# Patient Record
Sex: MALE | Race: WHITE | NOT HISPANIC OR LATINO | Employment: FULL TIME | URBAN - METROPOLITAN AREA
[De-identification: names, ages, dates, MRNs, and addresses within clinical notes are randomized per-mention and may not be internally consistent; named-entity substitution may affect disease eponyms.]

---

## 2023-03-24 ENCOUNTER — OFFICE VISIT (OUTPATIENT)
Dept: PRIMARY CARE | Facility: CLINIC | Age: 68
End: 2023-03-24
Payer: MEDICARE

## 2023-03-24 VITALS
HEART RATE: 76 BPM | WEIGHT: 203 LBS | OXYGEN SATURATION: 98 % | SYSTOLIC BLOOD PRESSURE: 126 MMHG | BODY MASS INDEX: 28.31 KG/M2 | DIASTOLIC BLOOD PRESSURE: 80 MMHG

## 2023-03-24 DIAGNOSIS — H69.92 ACUTE DYSFUNCTION OF LEFT EUSTACHIAN TUBE: Primary | ICD-10-CM

## 2023-03-24 PROBLEM — Z20.822 EXPOSURE TO COVID-19 VIRUS: Status: ACTIVE | Noted: 2023-03-24

## 2023-03-24 PROBLEM — R35.1 NOCTURIA: Status: ACTIVE | Noted: 2023-03-24

## 2023-03-24 PROBLEM — E03.8 SUBCLINICAL HYPOTHYROIDISM: Status: ACTIVE | Noted: 2023-03-24

## 2023-03-24 PROBLEM — L82.1 SEBORRHEIC KERATOSES: Status: ACTIVE | Noted: 2023-03-24

## 2023-03-24 PROBLEM — M25.569 ARTHRALGIA OF KNEE: Status: ACTIVE | Noted: 2017-09-18

## 2023-03-24 PROBLEM — M77.12 LATERAL EPICONDYLITIS OF LEFT ELBOW: Status: ACTIVE | Noted: 2023-03-24

## 2023-03-24 PROBLEM — E10.65 TYPE 1 DIABETES MELLITUS WITH HYPERGLYCEMIA (MULTI): Status: ACTIVE | Noted: 2020-10-26

## 2023-03-24 PROBLEM — E13.9 LADA (LATENT AUTOIMMUNE DIABETES IN ADULTS), MANAGED AS TYPE 1 (MULTI): Status: ACTIVE | Noted: 2020-10-26

## 2023-03-24 PROBLEM — E11.9 DIABETES (MULTI): Status: ACTIVE | Noted: 2017-09-18

## 2023-03-24 PROBLEM — E78.2 HYPERLIPEMIA, MIXED: Status: ACTIVE | Noted: 2017-09-18

## 2023-03-24 PROBLEM — B02.9 SHINGLES: Status: ACTIVE | Noted: 2023-03-24

## 2023-03-24 PROBLEM — R09.82 POST-NASAL DRIP: Status: ACTIVE | Noted: 2017-09-18

## 2023-03-24 PROBLEM — E06.3 HASHIMOTO'S THYROIDITIS: Status: ACTIVE | Noted: 2017-09-18

## 2023-03-24 PROBLEM — E78.5 HYPERLIPIDEMIA: Status: ACTIVE | Noted: 2023-03-24

## 2023-03-24 PROBLEM — Z20.822 SUSPECTED COVID-19 VIRUS INFECTION: Status: ACTIVE | Noted: 2023-03-24

## 2023-03-24 PROBLEM — E03.9 ACQUIRED HYPOTHYROIDISM: Status: ACTIVE | Noted: 2020-10-26

## 2023-03-24 PROBLEM — M25.50 ARTHRALGIA: Status: ACTIVE | Noted: 2023-03-24

## 2023-03-24 PROBLEM — Z77.018 EXPOSURE TO MERCURY: Status: ACTIVE | Noted: 2017-09-18

## 2023-03-24 PROCEDURE — 1036F TOBACCO NON-USER: CPT | Performed by: NURSE PRACTITIONER

## 2023-03-24 PROCEDURE — 1159F MED LIST DOCD IN RCRD: CPT | Performed by: NURSE PRACTITIONER

## 2023-03-24 PROCEDURE — 3074F SYST BP LT 130 MM HG: CPT | Performed by: NURSE PRACTITIONER

## 2023-03-24 PROCEDURE — 99213 OFFICE O/P EST LOW 20 MIN: CPT | Performed by: NURSE PRACTITIONER

## 2023-03-24 PROCEDURE — 3079F DIAST BP 80-89 MM HG: CPT | Performed by: NURSE PRACTITIONER

## 2023-03-24 RX ORDER — LEVOTHYROXINE SODIUM 175 UG/1
175 TABLET ORAL DAILY
COMMUNITY

## 2023-03-24 RX ORDER — TRIAMCINOLONE ACETONIDE 55 UG/1
2 SPRAY, METERED NASAL DAILY
Qty: 16.5 G | Refills: 0 | Status: SHIPPED | OUTPATIENT
Start: 2023-03-24 | End: 2024-03-23

## 2023-03-24 RX ORDER — INSULIN GLARGINE 100 [IU]/ML
INJECTION, SOLUTION SUBCUTANEOUS
COMMUNITY
Start: 2023-01-30

## 2023-03-24 RX ORDER — INSULIN LISPRO 100 [IU]/ML
INJECTION, SOLUTION INTRAVENOUS; SUBCUTANEOUS
COMMUNITY
Start: 2023-01-30

## 2023-03-24 RX ORDER — ROSUVASTATIN CALCIUM 5 MG/1
5 TABLET, COATED ORAL NIGHTLY
COMMUNITY

## 2023-03-24 NOTE — PROGRESS NOTES
Subjective   Patient ID: Sundeep Perez is a 67 y.o. male who presents for ear pain left side      Review of Systems   HENT:  Positive for hearing loss and tinnitus. Negative for ear discharge and ear pain.         Had a URI 10-12 days ago, which has since resolved except for persistent left ear pressure w/ muffled hearing.   Using Sudafed  5-6 days with no improvement.    Objective   /80   Pulse 76   Wt 92.1 kg (203 lb)   SpO2 98%   BMI 28.31 kg/m²     Physical Exam  Constitutional:       Appearance: Normal appearance.   HENT:      Head: Normocephalic.      Right Ear: Tympanic membrane and ear canal normal.      Left Ear: Ear canal normal. Decreased hearing noted. No drainage or tenderness. A middle ear effusion is present. No mastoid tenderness. Tympanic membrane is not injected or erythematous.   Neurological:      Mental Status: He is alert.         Assessment/Plan   Problem List Items Addressed This Visit    None  Visit Diagnoses       Acute dysfunction of left eustachian tube    -  Primary    Advised to use Nasocort. May continue Sudafed PRN.    Relevant Medications    triamcinolone (Nasacort) 55 mcg nasal inhaler          Follow-up if symptoms not improved w/ treatment.

## 2024-02-19 ENCOUNTER — OFFICE VISIT (OUTPATIENT)
Dept: PRIMARY CARE | Facility: CLINIC | Age: 69
End: 2024-02-19
Payer: MEDICARE

## 2024-02-19 VITALS
WEIGHT: 197 LBS | RESPIRATION RATE: 20 BRPM | TEMPERATURE: 98.1 F | HEART RATE: 80 BPM | OXYGEN SATURATION: 98 % | SYSTOLIC BLOOD PRESSURE: 122 MMHG | BODY MASS INDEX: 27.48 KG/M2 | DIASTOLIC BLOOD PRESSURE: 68 MMHG

## 2024-02-19 DIAGNOSIS — J06.9 UPPER RESPIRATORY TRACT INFECTION, UNSPECIFIED TYPE: Primary | ICD-10-CM

## 2024-02-19 DIAGNOSIS — H69.92 ACUTE DYSFUNCTION OF LEFT EUSTACHIAN TUBE: ICD-10-CM

## 2024-02-19 DIAGNOSIS — U07.1 COVID-19: ICD-10-CM

## 2024-02-19 LAB
POC RAPID INFLUENZA A: NEGATIVE
POC RAPID INFLUENZA B: NEGATIVE
POC SARS-COV-2 AG BINAX: ABNORMAL

## 2024-02-19 PROCEDURE — 87811 SARS-COV-2 COVID19 W/OPTIC: CPT | Performed by: NURSE PRACTITIONER

## 2024-02-19 PROCEDURE — 3074F SYST BP LT 130 MM HG: CPT | Performed by: NURSE PRACTITIONER

## 2024-02-19 PROCEDURE — 1160F RVW MEDS BY RX/DR IN RCRD: CPT | Performed by: NURSE PRACTITIONER

## 2024-02-19 PROCEDURE — 1159F MED LIST DOCD IN RCRD: CPT | Performed by: NURSE PRACTITIONER

## 2024-02-19 PROCEDURE — 3078F DIAST BP <80 MM HG: CPT | Performed by: NURSE PRACTITIONER

## 2024-02-19 PROCEDURE — 99214 OFFICE O/P EST MOD 30 MIN: CPT | Performed by: NURSE PRACTITIONER

## 2024-02-19 PROCEDURE — 1036F TOBACCO NON-USER: CPT | Performed by: NURSE PRACTITIONER

## 2024-02-19 PROCEDURE — 87804 INFLUENZA ASSAY W/OPTIC: CPT | Performed by: NURSE PRACTITIONER

## 2024-02-19 RX ORDER — FLUTICASONE PROPIONATE 50 MCG
1 SPRAY, SUSPENSION (ML) NASAL DAILY
Qty: 16 G | Refills: 0 | Status: SHIPPED | OUTPATIENT
Start: 2024-02-19 | End: 2024-03-20

## 2024-02-19 ASSESSMENT — ENCOUNTER SYMPTOMS
WHEEZING: 0
SHORTNESS OF BREATH: 0
MYALGIAS: 1
VOMITING: 0
FEVER: 0
SORE THROAT: 1
RHINORRHEA: 1
FATIGUE: 1
NAUSEA: 0
DIARRHEA: 0
HEADACHES: 1
CHILLS: 0
ABDOMINAL PAIN: 0
APPETITE CHANGE: 0
COUGH: 1

## 2024-02-19 NOTE — PROGRESS NOTES
Subjective   Patient ID: Sundeep Perez is a 68 y.o. male who presents for Sinusitis.    Patient's symptoms started two days ago with body aches and weakness. Pt says that he had a raspy throat. Patient has had a headache. Patient is not vaccinated against COVID. Pt has taken advil and it has helped. No chest pain or difficulty breathing.     Review of Systems   Constitutional:  Positive for fatigue. Negative for appetite change, chills and fever.   HENT:  Positive for congestion, rhinorrhea and sore throat.    Respiratory:  Positive for cough. Negative for shortness of breath and wheezing.    Gastrointestinal:  Negative for abdominal pain, diarrhea, nausea and vomiting.   Musculoskeletal:  Positive for myalgias.   Neurological:  Positive for headaches.     Objective   /68   Pulse 80   Temp 36.7 °C (98.1 °F) (Temporal)   Resp 20   Wt 89.4 kg (197 lb)   SpO2 98%   BMI 27.48 kg/m²     Physical Exam  Vitals reviewed.   Constitutional:       General: He is not in acute distress.     Appearance: Normal appearance. He is not ill-appearing or toxic-appearing.   HENT:      Head: Atraumatic.      Right Ear: Tympanic membrane, ear canal and external ear normal.      Left Ear: Tympanic membrane, ear canal and external ear normal.      Ears:      Comments: Serous effusion medial to both tympanic membranes bilaterally     Nose: Nose normal.      Mouth/Throat:      Mouth: Mucous membranes are moist.      Pharynx: Oropharynx is clear. No oropharyngeal exudate or posterior oropharyngeal erythema.      Comments: Moderate post nasal drainage, uvula midline  Eyes:      Conjunctiva/sclera: Conjunctivae normal.   Cardiovascular:      Rate and Rhythm: Normal rate and regular rhythm.      Heart sounds: Normal heart sounds. No murmur heard.  Pulmonary:      Effort: Pulmonary effort is normal.      Breath sounds: Normal breath sounds.   Neurological:      Mental Status: He is alert.     Assessment/Plan   Problem List Items  Addressed This Visit    None  Visit Diagnoses         Codes    Upper respiratory tract infection, unspecified type    -  Primary J06.9    Relevant Orders    POCT Influenza A/B manually resulted    POCT BinaxNOW Covid-19 Ag Card manually resulted (Completed)    Acute dysfunction of left eustachian tube     H69.92    Relevant Medications    fluticasone (Flonase) 50 mcg/actuation nasal spray    COVID-19     U07.1        Patient is positive for COVID today.     Discussed treatment options available for COVID infection, reviewed risks and benefits of the antiviral medication available. Patient declined the need for antiviral treatment at this time. Patient would like to wait and see how he feels in the next 2-3 days     Advised patient on use of humidifier and hot steam treatments. Discussed that patient is to drink plenty of fluids and stay well hydrated. Can take tylenol as needed for any fevers or discomfort. Patient can use flonase as well. Discussed that patient is to go to the ER for any chest pain, difficulty breathing, shortness of breath or new/concerning symptoms; she agreed. Pt reminded to self quarantine for at least five days since symptoms started and until he is feeling better. he was advised to wear a mask for at least 10 days after quarantine when in public; he agreed.

## 2024-06-12 ENCOUNTER — APPOINTMENT (OUTPATIENT)
Dept: PRIMARY CARE | Facility: CLINIC | Age: 69
End: 2024-06-12
Payer: MEDICARE

## 2024-06-12 VITALS
HEIGHT: 71 IN | OXYGEN SATURATION: 97 % | RESPIRATION RATE: 18 BRPM | BODY MASS INDEX: 27.16 KG/M2 | WEIGHT: 194 LBS | DIASTOLIC BLOOD PRESSURE: 70 MMHG | SYSTOLIC BLOOD PRESSURE: 110 MMHG | TEMPERATURE: 97 F | HEART RATE: 64 BPM

## 2024-06-12 DIAGNOSIS — Z12.5 SCREENING FOR PROSTATE CANCER: ICD-10-CM

## 2024-06-12 DIAGNOSIS — Z12.12 SCREENING FOR COLORECTAL CANCER: ICD-10-CM

## 2024-06-12 DIAGNOSIS — Z00.00 ROUTINE GENERAL MEDICAL EXAMINATION AT HEALTH CARE FACILITY: Primary | ICD-10-CM

## 2024-06-12 DIAGNOSIS — Z12.11 SCREENING FOR COLORECTAL CANCER: ICD-10-CM

## 2024-06-12 DIAGNOSIS — Z23 NEED FOR VACCINATION: ICD-10-CM

## 2024-06-12 PROBLEM — S83.90XA KNEE SPRAIN: Status: ACTIVE | Noted: 2024-06-12

## 2024-06-12 PROBLEM — S83.90XA SPRAIN OF KNEE: Status: ACTIVE | Noted: 2024-06-12

## 2024-06-12 PROBLEM — J06.9 UPPER RESPIRATORY TRACT INFECTION: Status: ACTIVE | Noted: 2017-09-18

## 2024-06-12 PROBLEM — M23.90 INTERNAL DERANGEMENT OF KNEE: Status: ACTIVE | Noted: 2024-06-12

## 2024-06-12 PROBLEM — H69.90 DYSFUNCTION OF EUSTACHIAN TUBE: Status: ACTIVE | Noted: 2024-06-12

## 2024-06-12 PROCEDURE — 1160F RVW MEDS BY RX/DR IN RCRD: CPT | Performed by: FAMILY MEDICINE

## 2024-06-12 PROCEDURE — G0439 PPPS, SUBSEQ VISIT: HCPCS | Performed by: FAMILY MEDICINE

## 2024-06-12 PROCEDURE — 1170F FXNL STATUS ASSESSED: CPT | Performed by: FAMILY MEDICINE

## 2024-06-12 PROCEDURE — 1159F MED LIST DOCD IN RCRD: CPT | Performed by: FAMILY MEDICINE

## 2024-06-12 PROCEDURE — 3074F SYST BP LT 130 MM HG: CPT | Performed by: FAMILY MEDICINE

## 2024-06-12 PROCEDURE — 3078F DIAST BP <80 MM HG: CPT | Performed by: FAMILY MEDICINE

## 2024-06-12 PROCEDURE — 1123F ACP DISCUSS/DSCN MKR DOCD: CPT | Performed by: FAMILY MEDICINE

## 2024-06-12 PROCEDURE — 1036F TOBACCO NON-USER: CPT | Performed by: FAMILY MEDICINE

## 2024-06-12 ASSESSMENT — ENCOUNTER SYMPTOMS
DIZZINESS: 0
SINUS PAIN: 0
OCCASIONAL FEELINGS OF UNSTEADINESS: 0
POLYPHAGIA: 0
CHILLS: 0
WHEEZING: 0
EYE REDNESS: 0
CONSTIPATION: 0
PALPITATIONS: 0
ABDOMINAL DISTENTION: 0
FATIGUE: 1
POLYDIPSIA: 0
SEIZURES: 0
LOSS OF SENSATION IN FEET: 0
DIFFICULTY URINATING: 0
ARTHRALGIAS: 1
NERVOUS/ANXIOUS: 0
CHEST TIGHTNESS: 0
AGITATION: 0
LIGHT-HEADEDNESS: 0
UNEXPECTED WEIGHT CHANGE: 0
SPEECH DIFFICULTY: 0
BRUISES/BLEEDS EASILY: 0
SHORTNESS OF BREATH: 0
BLOOD IN STOOL: 0
CONFUSION: 0
ADENOPATHY: 0
NECK PAIN: 0
FEVER: 0
TROUBLE SWALLOWING: 0
DYSURIA: 0
DEPRESSION: 0
DIARRHEA: 0

## 2024-06-12 ASSESSMENT — ACTIVITIES OF DAILY LIVING (ADL)
DOING_HOUSEWORK: INDEPENDENT
TAKING_MEDICATION: INDEPENDENT
GROCERY_SHOPPING: INDEPENDENT
BATHING: INDEPENDENT
MANAGING_FINANCES: INDEPENDENT
DRESSING: INDEPENDENT

## 2024-06-12 ASSESSMENT — PATIENT HEALTH QUESTIONNAIRE - PHQ9
2. FEELING DOWN, DEPRESSED OR HOPELESS: NOT AT ALL
SUM OF ALL RESPONSES TO PHQ9 QUESTIONS 1 AND 2: 0
1. LITTLE INTEREST OR PLEASURE IN DOING THINGS: NOT AT ALL

## 2024-06-12 NOTE — PATIENT INSTRUCTIONS

## 2024-06-12 NOTE — PROGRESS NOTES
Subjective   Patient ID: Sundeep Perez is a 68 y.o. male who presents for Medicare Annual Wellness Visit Subsequent.  HPI  Seeing  endocrinology for diabetes and     still  struggling  .. Hgba1c     still high  9.0... and complication     .being seen by endocrinology   HERE FOR RECHK OF THYROIDLast clinic visit was month (S) ago. Symptoms do not include weight gain, cold intolerance, fatigue, weakness, appetite, hair loss, dry skin    There is no known event that preceded symptom onset.  . Current treatment includes levothyroxine....being followed by endocriniology   Review of Systems   Constitutional:  Positive for fatigue. Negative for chills, fever and unexpected weight change.   HENT:  Negative for congestion, ear pain, hearing loss, nosebleeds, sinus pain and trouble swallowing.    Eyes:  Negative for redness and visual disturbance.   Respiratory:  Negative for chest tightness, shortness of breath and wheezing.    Cardiovascular:  Negative for chest pain and palpitations.   Gastrointestinal:  Negative for abdominal distention, blood in stool, constipation and diarrhea.   Endocrine: Positive for cold intolerance. Negative for heat intolerance, polydipsia, polyphagia and polyuria.   Genitourinary:  Negative for difficulty urinating, dysuria and urgency.   Musculoskeletal:  Positive for arthralgias. Negative for neck pain.   Skin:  Negative for rash.   Neurological:  Negative for dizziness, seizures, syncope, speech difficulty and light-headedness.   Hematological:  Negative for adenopathy. Does not bruise/bleed easily.   Psychiatric/Behavioral:  Negative for agitation and confusion. The patient is not nervous/anxious.        Objective   Physical Exam  Vitals: I have reviewed the vitals  General: Well-developed.  In no acute distress.  Eyes:   sclera nonicteric.  Conjunctiva not injected.  No discharge.   HEAD: Normocephalic, atraumatic.  HEENT   Mucous membranes moist.  Posterior oropharynx nonerythematous, no  tonsillar exudates.      No cervical lymphadenopathy.  Cardio: Regular rate and rhythm.  No murmur, rub or gallop.  Pulmonary: Lungs clear to auscultation in all fields.  No accessory muscle use.  GI/: Normal active bowel sounds.  Soft, nontender.  No masses or organomegaly appreciated.  Musculoskeletal: No gross deformities appreciated.  Neuro: Alert, age-appropriate.  Normal muscle tone.  Moving all extremities.  Skin: No rash, bruises or lesions.   Labs  Last 12 months   Office Visit on 02/19/2024   Component Date Value Ref Range Status    POC Rapid Influenza A 02/19/2024 Negative  Negative Final    POC Rapid Influenza B 02/19/2024 Negative  Negative Final    POC DEA-COV-2 AG 02/19/2024 Positive test for SARS-CoV-2 (antigen detected) (A)  Presumptive negative test for SARS-CoV-2 (no antigen detected) Final       [facility]  Outside Information      suggestion  Information displayed in this report may not trend or trigger automated decision support.   LIPID PANEL BASIC  Order: [id number]  Component  Ref Range & Units 7 d ago   Cholesterol, Total  <200 mg/dL 146   Comment: <200 mg/dL, Desirable   200-239 mg/dL, Borderline high  >239 mg/dL, High   Triglyceride  <150 mg/dL 57   Comment: <150 mg/dL, Normal   150-199 mg/dL, Borderline high   200-499 mg/dL, High  >499 mg/dL, Very high   HDL Cholesterol  >39 mg/dL 41   Comment:  40-59 mg/dL, Acceptable  >59 mg/dL, High: Negative risk factor for coronary heart disease  <40 mg/dL, Low: Positive risk factor for coronary heart disease   Non HDL Cholesterol  <130 mg/dL 105   Comment: <130 mg/dL, Optimal   130-159 mg/dL, Near optimal/above optimal   160-189 mg/dL, Borderline high   190-219 mg/dL, High  >219 mg/dL, Very high  Secondary prevention optimal non HDL Cholesterol levels are recommended to be <100 mg/dL   Fasting Time  hrs 12   VLDL Cholesterol  <30 mg/dL 11   TC:HDL Ratio  <5.10 3.56     [facility]  Outside Information      Contains abnormal data  HEMOGLOBIN A1C  Order: [id number]   Status: Final result           Component  Ref Range & Units 2 mo ago  (4/10/24) 1 yr ago  (3/27/23) 3 yr ago  (10/28/20) 3 yr ago  (8/12/20)   Hemoglobin A1C  4.3 - 5.6 % 9.6 High  10.0 High  CM 8.1 High  CM 8.5 High  CM   Comment: American Diabetes Association guidelines indicate that patients with HgbA1c in the range 5.7-6.4% are at increased risk for development of diabetes, and intervention by lifestyle modification may be beneficial. HgbA1c greater or equal to 6.5% is considered diagnostic of diabetes.   Estimated Average Glucose  mg/dL 229 240   CM   Comment: eAG: (Estimated average glucose) is a calculated value from HgbA1c and is representative of the average blood glucose level in the last 2-3 month period.   Resulting Agency [facility]                Specimen Collected: 04/10/24 08:25              [facility]  Outside Information      suggestion  Information displayed in this report may not trend or trigger automated decision support.      Contains abnormal data COMPREHENSIVE METABOLIC PANEL  Order: [id number]  Component  Ref Range & Units 7 d ago   Protein, Total  6.3 - 8.0 g/dL 6.9   Albumin  3.9 - 4.9 g/dL 4.0   Calcium, Total  8.5 - 10.2 mg/dL 9.1   Bilirubin, Total  0.2 - 1.3 mg/dL 0.6   Alkaline Phosphatase  38 - 113 U/L 65   AST  14 - 40 U/L 22   ALT  10 - 54 U/L 12   Glucose  74 - 99 mg/dL 84   Comment: The American Diabetes Association (ADA) provides guidance for cutoff values for fasting glucose and random glucose. The ADA defines fasting as no caloric intake for at least 8 hours. Fasting plasma glucose results between 100 to 125 mg/dL indicate increased risk for diabetes (prediabetes).  Fasting plasma glucose results greater than or equal to 126 mg/dL meet the criteria for diagnosis of diabetes. In the absence of unequivocal  hyperglycemia, results should be confirmed by repeat testing. In a patient with classic symptoms of hyperglycemia or hyperglycemic crisis, random plasma glucose results greater than or equal to 200 mg/dL meet the criteria for diagnosis of diabetes.  Reference: Standards of Medical Care in Diabetes 2016, American Diabetes Association. Diabetes Care. 2016.39(Suppl 1).   BUN  9 - 24 mg/dL 13   Creatinine  0.73 - 1.22 mg/dL 1.06   Sodium  136 - 144 mmol/L 142   Potassium  3.7 - 5.1 mmol/L 4.0   Chloride  98 - 107 mmol/L 108 High    CO2  22 - 30 mmol/L 25   Anion Gap  8 - 15 mmol/L 9   Estimated Glomerular Filtration Rate  >=60 mL/min/1.73m² 76   Comment: Estimated Glomerular Filtration Rate (eGFR) is calculated using the 2021 CKD-EPI creatinine equation. This equation utilizes serum creatinine, sex, and age as parameters. The creatinine assay has traceable calibration to isotope dilution-mass spectrometry. Refer to KDIGO guidelines for clinical interpretation. In patients with unstable renal function, e.g. those with acute kidney injury, the eGFR may not accurately reflect actual GFR.   Resulting Agency [facility]     Specimen Collected: 06/05/24 07:56    Performed by: [facility] Last Resulted: 06/05/24 15:03   Received From: [facility]  Result Received: 06/12/24 13   [facility]  Outside Information      suggestion  Information displayed in this report may not trend or trigger automated decision support.     TSH BLD  Order: [id number]  Component  Ref Range & Units 8 mo ago   TSH  0.270 - 4.200 mIU/L 1.840   Resulting Agency [facility]     Specimen Collected: 09/20/23 15:35    Performed by: [facility] Last Resulted: 09/21/23 16:52   Received From: [facility]  Result Received: 02/19/24 11:35    Received Information  Result Report    TSH BLD (Order #[id number]) on 9/20/23         Assessment/Plan   Problem List Items  Addressed This Visit    None  Visit Diagnoses       Routine general medical examination at health care facility    -  Primary    Need for vaccination        Relevant Orders    Pneumococcal conjugate vaccine 20-valent IM    Screening for colorectal cancer        Relevant Orders    Colonoscopy Screening; High Risk Patient    Screening for prostate cancer        Relevant Orders    Prostate Specific Antigen, Screen

## 2024-08-26 DIAGNOSIS — Z12.11 COLON CANCER SCREENING: ICD-10-CM

## 2024-08-26 RX ORDER — POLYETHYLENE GLYCOL 3350, SODIUM CHLORIDE, SODIUM BICARBONATE, POTASSIUM CHLORIDE 420; 11.2; 5.72; 1.48 G/4L; G/4L; G/4L; G/4L
4000 POWDER, FOR SOLUTION ORAL ONCE
Qty: 4000 ML | Refills: 0 | Status: SHIPPED | OUTPATIENT
Start: 2024-08-26 | End: 2024-08-26

## 2024-09-04 ENCOUNTER — ANESTHESIA EVENT (OUTPATIENT)
Dept: GASTROENTEROLOGY | Facility: EXTERNAL LOCATION | Age: 69
End: 2024-09-04

## 2024-09-16 ENCOUNTER — ANESTHESIA (OUTPATIENT)
Dept: GASTROENTEROLOGY | Facility: EXTERNAL LOCATION | Age: 69
End: 2024-09-16

## 2024-09-16 ENCOUNTER — APPOINTMENT (OUTPATIENT)
Dept: GASTROENTEROLOGY | Facility: EXTERNAL LOCATION | Age: 69
End: 2024-09-16
Payer: MEDICARE

## 2024-09-17 ENCOUNTER — APPOINTMENT (OUTPATIENT)
Dept: GASTROENTEROLOGY | Facility: EXTERNAL LOCATION | Age: 69
End: 2024-09-17
Payer: MEDICARE

## 2024-09-17 VITALS
RESPIRATION RATE: 16 BRPM | BODY MASS INDEX: 25.76 KG/M2 | OXYGEN SATURATION: 100 % | HEIGHT: 71 IN | HEART RATE: 72 BPM | SYSTOLIC BLOOD PRESSURE: 112 MMHG | DIASTOLIC BLOOD PRESSURE: 74 MMHG | TEMPERATURE: 97.9 F | WEIGHT: 184 LBS

## 2024-09-17 DIAGNOSIS — Z12.11 SCREENING FOR COLORECTAL CANCER: ICD-10-CM

## 2024-09-17 DIAGNOSIS — Z12.12 SCREENING FOR COLORECTAL CANCER: ICD-10-CM

## 2024-09-17 PROCEDURE — 45385 COLONOSCOPY W/LESION REMOVAL: CPT | Performed by: STUDENT IN AN ORGANIZED HEALTH CARE EDUCATION/TRAINING PROGRAM

## 2024-09-17 RX ORDER — LIDOCAINE HYDROCHLORIDE 20 MG/ML
INJECTION, SOLUTION INFILTRATION; PERINEURAL AS NEEDED
Status: DISCONTINUED | OUTPATIENT
Start: 2024-09-17 | End: 2024-09-17

## 2024-09-17 RX ORDER — SODIUM CHLORIDE, SODIUM LACTATE, POTASSIUM CHLORIDE, CALCIUM CHLORIDE 600; 310; 30; 20 MG/100ML; MG/100ML; MG/100ML; MG/100ML
20 INJECTION, SOLUTION INTRAVENOUS CONTINUOUS
Status: DISCONTINUED | OUTPATIENT
Start: 2024-09-17 | End: 2024-09-18 | Stop reason: HOSPADM

## 2024-09-17 RX ORDER — PROPOFOL 10 MG/ML
INJECTION, EMULSION INTRAVENOUS AS NEEDED
Status: DISCONTINUED | OUTPATIENT
Start: 2024-09-17 | End: 2024-09-17

## 2024-09-17 RX ORDER — ONDANSETRON HYDROCHLORIDE 2 MG/ML
4 INJECTION, SOLUTION INTRAVENOUS ONCE AS NEEDED
Status: DISCONTINUED | OUTPATIENT
Start: 2024-09-17 | End: 2024-09-18 | Stop reason: HOSPADM

## 2024-09-17 RX ORDER — SODIUM CHLORIDE 9 MG/ML
INJECTION, SOLUTION INTRAVENOUS CONTINUOUS PRN
Status: DISCONTINUED | OUTPATIENT
Start: 2024-09-17 | End: 2024-09-17

## 2024-09-17 SDOH — HEALTH STABILITY: MENTAL HEALTH: CURRENT SMOKER: 0

## 2024-09-17 ASSESSMENT — PAIN SCALES - GENERAL
PAINLEVEL_OUTOF10: 0 - NO PAIN
PAIN_LEVEL: 0
PAINLEVEL_OUTOF10: 0 - NO PAIN

## 2024-09-17 ASSESSMENT — PAIN - FUNCTIONAL ASSESSMENT
PAIN_FUNCTIONAL_ASSESSMENT: 0-10

## 2024-09-17 ASSESSMENT — COLUMBIA-SUICIDE SEVERITY RATING SCALE - C-SSRS
1. IN THE PAST MONTH, HAVE YOU WISHED YOU WERE DEAD OR WISHED YOU COULD GO TO SLEEP AND NOT WAKE UP?: NO
2. HAVE YOU ACTUALLY HAD ANY THOUGHTS OF KILLING YOURSELF?: NO
6. HAVE YOU EVER DONE ANYTHING, STARTED TO DO ANYTHING, OR PREPARED TO DO ANYTHING TO END YOUR LIFE?: NO

## 2024-09-17 NOTE — ANESTHESIA POSTPROCEDURE EVALUATION
Patient: Sundeep Perez    Procedure Summary       Date: 09/17/24 Room / Location: [facility]    Anesthesia Start: 1225 Anesthesia Stop: 1253    Procedure: COLONOSCOPY Diagnosis: Screening for colorectal cancer    Scheduled Providers: Maki Araujo MD Responsible Provider: NAOMY Stokes    Anesthesia Type: MAC ASA Status: 2            Anesthesia Type: MAC    Vitals Value Taken Time   /63 09/17/24 1254   Temp 36.6 09/17/24 1254   Pulse `64 09/17/24 1254   Resp 11 09/17/24 1254   SpO2 95 09/17/24 1254       Anesthesia Post Evaluation    Patient location during evaluation: bedside  Patient participation: complete - patient participated  Level of consciousness: awake  Pain score: 0  Pain management: adequate  Airway patency: patent  Cardiovascular status: acceptable  Respiratory status: acceptable  Hydration status: acceptable  Postoperative Nausea and Vomiting: none      There were no known notable events for this encounter.

## 2024-09-17 NOTE — ANESTHESIA PREPROCEDURE EVALUATION
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), managed as type 1 (Multi)   (+) Subclinical hypothyroidism   (+) Type 1 diabetes mellitus with hyperglycemia (Multi)      ID   (+) Shingles   (+) Upper respiratory tract infection       Clinical information reviewed:    Allergies  Meds   Med Hx             NPO Detail:  NPO/Void Status  Date of Last Liquid: 09/17/24  Time of Last Liquid: 0730  Date of Last Solid: 09/15/24  Time of Last Solid: 1800  Last Intake Type: Clear fluids         Physical Exam    Airway  Mallampati: II  TM distance: >3 FB  Neck ROM: full     Cardiovascular - normal exam  Rhythm: regular  Rate: normal     Dental - normal exam     Pulmonary - normal exam  Breath sounds clear to auscultation     Abdominal - normal exam         Anesthesia Plan    History of general anesthesia?: yes  History of complications of general anesthesia?: no    ASA 2     MAC     The patient is not a current smoker.    intravenous induction   Anesthetic plan and risks discussed with patient.    Plan discussed with CRNA.

## 2024-09-17 NOTE — DISCHARGE INSTRUCTIONS
Patient Instructions Post Procedure      The anesthetics, sedatives or narcotics which were given to you today will be acting in your body for the next 24 hours, so you might feel a little sleepy or groggy.  This feeling should slowly wear off. Carefully read and follow the instructions.     You received sedation today:  - Do not drive or operate any machinery or power tools of any kind.   - No alcoholic beverages today, not even beer or wine.  - Do not make any important decisions or sign any legal documents.  - No over the counter medications that contain alcohol or that may cause drowsiness.    While it is common to experience mild to moderate abdominal distention, gas, or belching after your procedure, if any of these symptoms occur following discharge from the GI Lab or within one week of having your procedure, call the [facility] to be advised whether a visit to your nearest Urgent Care or Emergency Department is indicated.  Take this paper with you if you go.   - If you develop an allergic reaction to the medications that were given during your procedure such as difficulty breathing, rash, hives, severe nausea, vomiting or lightheadedness.  - If you experience chest pain, shortness of breath, severe abdominal pain, fevers and chills.  -If you develop signs and symptoms of bleeding such as blood in your spit, if your stools turn black, tarry, or bloody  - If you have not urinated within 8 hours following your procedure.  - If your IV site becomes painful, red, inflamed, or looks infected.    If you received a biopsy/polypectomy/sphincterotomy the following instructions apply below:  __ Do not use Aspirin containing products, non-steroidal medications or anti-coagulants for one week following your procedure. (Examples of these types of medications are: Advil, Arthrotec, Aleve, Coumadin, Ecotrin, Heparin, Ibuprofen, Indocin, Motrin, Naprosyn, Nuprin, Plavix, Vioxx, and Voltarin, or their generic  forms.  This list is not all-inclusive.  Check with your physician or pharmacist before resuming medications.)   __ Eat a soft diet today.  Avoid foods that are poorly digested for the next 24 hours.  These foods would include: nuts, beans, lettuce, red meats, and fried foods. Start with liquids and advance your diet as tolerated, gradually work up to eating solids.   __ Do not have a Barium Study or Enema for one week.    Your physician recommends the additional following instructions:    -You have a contact number available for emergencies. The signs and symptoms of potential delayed complications were discussed with you. You may return to normal activities tomorrow.  -Resume your previous diet or other if specified.  -Continue your present medications.   -We are waiting for your pathology results, if applicable.  -The findings and recommendations have been discussed with you and/or family.  - Please see Medication Reconciliation Form for new medication/medications prescribed.     If you experience any problems or have any questions following discharge from the GI Lab, please call: [phone] from 7 am- 4:30 pm.  In the event of an emergency please go to the closest Emergency Department or call Dr. Araujo after hours [phone]

## 2024-09-17 NOTE — H&P
Procedure H&P    Patient Profile-Procedures  Name Sundeep Perez  Date of Birth 1955  MRN [id number]  Address   [address]  [place] [id number] [address] [place] [address]    Primary Phone Number [phone]  Secondary Phone Number    PCP Isael Moran    Procedure(s):  Procedures: Colonoscopy  Primary contact name and number   Extended Emergency Contact Information  Primary Emergency Contact: Renee Perez  Home Phone: [phone]  Relation: Spouse    General Health  Weight   Vitals:    09/17/24 1153   Weight: 83.5 kg (184 lb)     BMI Body mass index is 25.66 kg/m².    Allergies  No Known Allergies    Past Medical History   Past Medical History:   Diagnosis Date    Diabetes (Multi)     Hyperlipidemia        Provider assessment  Diagnosis: Colon Cancer Screening/Surveillance   Medication Reviewed - yes  Prior to Admission medications    Medication Sig Start Date End Date Taking? Authorizing Provider   insulin glargine (Lantus) 100 unit/mL (3 mL) pen Use 18 units once daily - E10.65 1/30/23  Yes Historical Provider, MD   insulin lispro (HumaLOG) 100 unit/mL injection Use 1 unit for every 8 grams of carbs plus scale, 1 unit for every 25 over 150 (TDD 53 units) E10.65. 1/30/23  Yes Historical Provider, MD   levothyroxine (Synthroid, Levoxyl) 175 mcg tablet Take 1 tablet (175 mcg) by mouth once daily.   Yes Historical Provider, MD   rosuvastatin (Crestor) 5 mg tablet Take 1 tablet (5 mg) by mouth once daily at bedtime.   Yes Historical Provider, MD   fluticasone (Flonase) 50 mcg/actuation nasal spray Administer 1 spray into each nostril once daily. Shake gently. Before first use, prime pump. After use, clean tip and replace cap. 2/19/24 3/20/24  DEEDEE Simmons-CNP       Physical Exam  Vitals:    09/17/24 1153   BP: 118/74   Pulse: 69   Resp: 16   Temp: 36.8 °C (98.2 °F)   SpO2: 100%        General: A&Ox3, NAD.  HEENT: AT/NC.   CV: RRR. No murmur.  Resp: CTA bilaterally. No  wheezing, rhonchi or rales.   GI: Soft, NT/ND. BSx4.  Extrem: No edema. Pulses intact.  Neuro: No focal deficits.   Psych: Normal mood and affect.      Procedure Plan - pre-procedural (re)assesment completed by physician:  discharge/transfer patient when discharge criteria met    Maki Araujo MD  9/17/2024 12:14 PM

## 2024-09-17 NOTE — Clinical Note
Patient tolerated the procedure well and is comfortable with no complaints of pain. Vital signs stable. Arousable prior to transport. Patient transported to PACU via stretcher. Handoff completed. Abd pain

## 2024-09-23 LAB
LABORATORY COMMENT REPORT: NORMAL
PATH REPORT.FINAL DX SPEC: NORMAL
PATH REPORT.GROSS SPEC: NORMAL
PATH REPORT.TOTAL CANCER: NORMAL

## 2024-11-27 ENCOUNTER — HOSPITAL ENCOUNTER (OUTPATIENT)
Dept: RADIOLOGY | Facility: CLINIC | Age: 69
Discharge: HOME | End: 2024-11-27
Payer: MEDICARE

## 2024-11-27 ENCOUNTER — OFFICE VISIT (OUTPATIENT)
Dept: ORTHOPEDIC SURGERY | Facility: CLINIC | Age: 69
End: 2024-11-27
Payer: MEDICARE

## 2024-11-27 DIAGNOSIS — M25.512 LEFT SHOULDER PAIN, UNSPECIFIED CHRONICITY: ICD-10-CM

## 2024-11-27 PROCEDURE — 1123F ACP DISCUSS/DSCN MKR DOCD: CPT | Performed by: STUDENT IN AN ORGANIZED HEALTH CARE EDUCATION/TRAINING PROGRAM

## 2024-11-27 PROCEDURE — 99204 OFFICE O/P NEW MOD 45 MIN: CPT | Performed by: STUDENT IN AN ORGANIZED HEALTH CARE EDUCATION/TRAINING PROGRAM

## 2024-11-27 PROCEDURE — 20610 DRAIN/INJ JOINT/BURSA W/O US: CPT | Mod: LT | Performed by: STUDENT IN AN ORGANIZED HEALTH CARE EDUCATION/TRAINING PROGRAM

## 2024-11-27 PROCEDURE — 2500000004 HC RX 250 GENERAL PHARMACY W/ HCPCS (ALT 636 FOR OP/ED): Performed by: STUDENT IN AN ORGANIZED HEALTH CARE EDUCATION/TRAINING PROGRAM

## 2024-11-27 PROCEDURE — 73030 X-RAY EXAM OF SHOULDER: CPT | Mod: LT

## 2024-11-27 PROCEDURE — 99214 OFFICE O/P EST MOD 30 MIN: CPT | Mod: 25 | Performed by: STUDENT IN AN ORGANIZED HEALTH CARE EDUCATION/TRAINING PROGRAM

## 2024-11-27 PROCEDURE — 73030 X-RAY EXAM OF SHOULDER: CPT | Mod: LEFT SIDE | Performed by: STUDENT IN AN ORGANIZED HEALTH CARE EDUCATION/TRAINING PROGRAM

## 2024-11-27 RX ORDER — LIDOCAINE HYDROCHLORIDE 10 MG/ML
4 INJECTION, SOLUTION INFILTRATION; PERINEURAL
Status: COMPLETED | OUTPATIENT
Start: 2024-11-27 | End: 2024-11-27

## 2024-11-27 RX ORDER — TRIAMCINOLONE ACETONIDE 40 MG/ML
40 INJECTION, SUSPENSION INTRA-ARTICULAR; INTRAMUSCULAR
Status: COMPLETED | OUTPATIENT
Start: 2024-11-27 | End: 2024-11-27

## 2024-11-27 NOTE — PROGRESS NOTES
Chief Complaint   Patient presents with    Left Shoulder - New Patient Visit, Pain     Xray today       HPI  69-year-old male presents today for evaluation of his left shoulder sustained a shoulder injury about 20 years ago feels like his shoulder is never really the same since this occurred.  Over the past 6 months however has had acute worsening of this baseline shoulder pain difficult time reaching for objects as well as putting on his coat.  Pain primarily about the shoulder region.  Denies any recent history of trauma    Past Medical History:   Diagnosis Date    Diabetes (Multi)     Hyperlipidemia        Past Surgical History:   Procedure Laterality Date    OTHER SURGICAL HISTORY  10/25/2019    No history of surgery        No Known Allergies     Physical exam    General: Alert and oriented to place, person, and time.  No acute distress and breathing comfortably; pleasant and cooperative with the examination.  HEENT: Head is normocephalic and atraumatic.  Neck: Supple, no visible swelling.  Cardiovascular: Good perfusion to the affected extremity.  Lungs: No audible wheezing or labored breathing.  Abdomen: Nondistended  HEME/Lymph : No visible abnormalities bilateral lower extremity    Extremity:  Left shoulder:     Skin healthy to gross inspection  No ecchymosis, no swelling, no gross atrophy     No tenderness to palpation over acromioclavicular joint  No tenderness to palpation over biceps tendon  No tenderness to palpation over the cervical spine      ROM:  No significant decrease in forward flexion, internal or external rotation      Strength:  4/5 Resisted elevation  5/5 Resisted external rotation  Negative lift off test   Negative Spurling´s test  Positive Neer and Hawking´s test  Mild pain with Speed's test  Neurovascular exam normal distally    Diagnostics:  XR shoulder left 2+ views    Result Date: 11/27/2024  Interpreted By:  Isael Moran III, STUDY: XR SHOULDER LEFT 2+ VIEWS; ;  11/27/2024 1:17  pm   INDICATION: Signs/Symptoms:pain.   ,M25.512 Pain in left shoulder   COMPARISON: None.   ACCESSION NUMBER(S): [id number]   ORDERING CLINICIAN: DAYNE ALVARADO   FINDINGS: Three views left shoulder: Grashey scapular Y, axillary: No acute osseous abnormality no fracture or dislocation appreciated maintained joint space within the glenohumeral articulation there is some sclerosis about the greater tuberosity mild narrowing of the acromial humeral interval.       No acute osseous abnormality     MACRO: None   Signed by: Dayne Alvarado III 11/27/2024 1:20 PM Dictation workstation:   [id number]       Procedure:  L Inj/Asp: L subacromial bursa on 11/27/2024 1:34 PM  Indications: pain  Details: 22 G needle, posterior approach  Medications: 40 mg triamcinolone acetonide 40 mg/mL; 4 mL lidocaine 10 mg/mL (1 %)  Consent was given by the patient. Immediately prior to procedure a time out was called to verify the correct patient, procedure, equipment, support staff and site/side marked as required. Patient was prepped and draped in the usual sterile fashion.           Assessment:  69-year-old male with left shoulder rotator cuff tendinitis possible chronic rotator cuff tear    Treatment plan:  The natural history of the condition and its associated treatment alternatives including surgical and nonsurgical options were discussed with the patient at length.  Will proceed with a subacromial injection today to provide symptomatic relief  Cussed activities to avoid as well as importance of using pain as a guide  Follow-up in 6 weeks, if fails to improve may benefit from an MRI of the left shoulder    All of the patient's questions were answered.

## 2025-01-08 ENCOUNTER — OFFICE VISIT (OUTPATIENT)
Dept: ORTHOPEDIC SURGERY | Facility: CLINIC | Age: 70
End: 2025-01-08
Payer: MEDICARE

## 2025-01-08 DIAGNOSIS — M25.512 LEFT SHOULDER PAIN, UNSPECIFIED CHRONICITY: Primary | ICD-10-CM

## 2025-01-08 PROCEDURE — 99213 OFFICE O/P EST LOW 20 MIN: CPT | Performed by: STUDENT IN AN ORGANIZED HEALTH CARE EDUCATION/TRAINING PROGRAM

## 2025-01-08 PROCEDURE — 1123F ACP DISCUSS/DSCN MKR DOCD: CPT | Performed by: STUDENT IN AN ORGANIZED HEALTH CARE EDUCATION/TRAINING PROGRAM

## 2025-01-08 NOTE — PROGRESS NOTES
Chief Complaint   Patient presents with    Left Shoulder - Follow-up     S/P manish inj 11/27/2024       HPI  69-year-old male presents today for evaluation of his left shoulder sustained a shoulder injury about 20 years ago feels like his shoulder is never really the same since this occurred.  Over the past 6 months however has had acute worsening of this baseline shoulder pain difficult time reaching for objects as well as putting on his coat.  Pain primarily about the shoulder region.  Denies any recent history of trauma      Last visit we did proceed with a cortisone injection 11/27/2024.  This is provided him significant relief very happy with the way his shoulder is current feeling.  Still does have some mild discomfort when putting his coat on 1-2 out of 10 on the pain scale much improved in comparison to what it was prior.    Past Medical History:   Diagnosis Date    Diabetes (Multi)     Hyperlipidemia        Past Surgical History:   Procedure Laterality Date    OTHER SURGICAL HISTORY  10/25/2019    No history of surgery        No Known Allergies     Physical exam    General: Alert and oriented to place, person, and time.  No acute distress and breathing comfortably; pleasant and cooperative with the examination.  HEENT: Head is normocephalic and atraumatic.  Neck: Supple, no visible swelling.  Cardiovascular: Good perfusion to the affected extremity.  Lungs: No audible wheezing or labored breathing.  Abdomen: Nondistended  HEME/Lymph : No visible abnormalities bilateral lower extremity    Extremity:  Left shoulder:     Skin healthy to gross inspection  No ecchymosis, no swelling, no gross atrophy     No tenderness to palpation over acromioclavicular joint  No tenderness to palpation over biceps tendon  No tenderness to palpation over the cervical spine      ROM:  No significant decrease in forward flexion, internal or external rotation      Strength:  4/5 Resisted elevation  5/5 Resisted external  rotation  Negative lift off test   Negative Spurling´s test  Positive Neer and Hawking´s test  Mild pain with Speed's test  Neurovascular exam normal distally    Diagnostics:  XR shoulder left 2+ views    Result Date: 11/27/2024  Interpreted By:  Dayne Alvarado III, STUDY: XR SHOULDER LEFT 2+ VIEWS; ;  11/27/2024 1:17 pm   INDICATION: Signs/Symptoms:pain.   ,M25.512 Pain in left shoulder   COMPARISON: None.   ACCESSION NUMBER(S): [id number]   ORDERING CLINICIAN: DAYNE ALVARADO   FINDINGS: Three views left shoulder: Grashey scapular Y, axillary: No acute osseous abnormality no fracture or dislocation appreciated maintained joint space within the glenohumeral articulation there is some sclerosis about the greater tuberosity mild narrowing of the acromial humeral interval.       No acute osseous abnormality     MACRO: None   Signed by: Dayne Alvarado III 11/27/2024 1:20 PM Dictation workstation:   [id number]       Procedure:  Procedures    Assessment:  69-year-old male with left shoulder rotator cuff tendinitis possible chronic rotator cuff tear    Treatment plan:  The natural history of the condition and its associated treatment alternatives including surgical and nonsurgical options were discussed with the patient at length.  Pain and symptoms much improved with cortisone injection medicine is currently still providing effect.  Sundeep does wish to proceed seed with continued expectant management discussed activities to avoid as well as importance of using pain as a guide.  He will follow-up as needed  All of the patient's questions were answered.

## 2025-06-17 ENCOUNTER — APPOINTMENT (OUTPATIENT)
Dept: PRIMARY CARE | Facility: CLINIC | Age: 70
End: 2025-06-17
Payer: MEDICARE

## 2025-06-17 VITALS
WEIGHT: 195 LBS | HEART RATE: 90 BPM | DIASTOLIC BLOOD PRESSURE: 67 MMHG | BODY MASS INDEX: 27.3 KG/M2 | SYSTOLIC BLOOD PRESSURE: 113 MMHG | RESPIRATION RATE: 18 BRPM | TEMPERATURE: 97 F | HEIGHT: 71 IN | OXYGEN SATURATION: 98 %

## 2025-06-17 DIAGNOSIS — E08.00 DIABETES MELLITUS DUE TO UNDERLYING CONDITION WITH HYPEROSMOLARITY WITHOUT COMA, UNSPECIFIED WHETHER LONG TERM INSULIN USE: ICD-10-CM

## 2025-06-17 DIAGNOSIS — E10.65 TYPE 1 DIABETES MELLITUS WITH HYPERGLYCEMIA (MULTI): ICD-10-CM

## 2025-06-17 DIAGNOSIS — N40.0 BENIGN PROSTATIC HYPERPLASIA WITHOUT LOWER URINARY TRACT SYMPTOMS: Primary | ICD-10-CM

## 2025-06-17 DIAGNOSIS — N52.01 ERECTILE DYSFUNCTION DUE TO ARTERIAL INSUFFICIENCY: ICD-10-CM

## 2025-06-17 DIAGNOSIS — E10.9 TYPE 1 DIABETES MELLITUS WITHOUT RETINOPATHY (MULTI): ICD-10-CM

## 2025-06-17 DIAGNOSIS — N52.8 OTHER MALE ERECTILE DYSFUNCTION: ICD-10-CM

## 2025-06-17 DIAGNOSIS — Z12.5 SCREENING FOR PROSTATE CANCER: ICD-10-CM

## 2025-06-17 DIAGNOSIS — E08.01 DIABETES MELLITUS DUE TO UNDERLYING CONDITION WITH HYPEROSMOLAR COMA, UNSPECIFIED WHETHER LONG TERM INSULIN USE: ICD-10-CM

## 2025-06-17 DIAGNOSIS — Z00.00 HEALTHCARE MAINTENANCE: ICD-10-CM

## 2025-06-17 DIAGNOSIS — E13.9 LADA (LATENT AUTOIMMUNE DIABETES IN ADULTS), MANAGED AS TYPE 1 (MULTI): ICD-10-CM

## 2025-06-17 PROBLEM — Z96.41 INSULIN PUMP STATUS: Status: ACTIVE | Noted: 2025-05-14

## 2025-06-17 PROBLEM — N40.1 BENIGN PROSTATIC HYPERPLASIA WITH LOWER URINARY TRACT SYMPTOMS: Status: ACTIVE | Noted: 2025-06-17

## 2025-06-17 LAB
POC BILIRUBIN, URINE: NEGATIVE
POC BLOOD, URINE: NEGATIVE
POC COLOR, URINE: YELLOW
POC GLUCOSE, URINE: NEGATIVE MG/DL
POC KETONES, URINE: NEGATIVE MG/DL
POC NITRITE,URINE: NEGATIVE
POC PH, URINE: 5 PH
POC PROTEIN, URINE: NEGATIVE MG/DL
POC SPECIFIC GRAVITY, URINE: >=1.03
POC UROBILINOGEN, URINE: 0.2 EU/DL

## 2025-06-17 PROCEDURE — 1036F TOBACCO NON-USER: CPT | Performed by: FAMILY MEDICINE

## 2025-06-17 PROCEDURE — 3008F BODY MASS INDEX DOCD: CPT | Performed by: FAMILY MEDICINE

## 2025-06-17 PROCEDURE — 3078F DIAST BP <80 MM HG: CPT | Performed by: FAMILY MEDICINE

## 2025-06-17 PROCEDURE — 3074F SYST BP LT 130 MM HG: CPT | Performed by: FAMILY MEDICINE

## 2025-06-17 PROCEDURE — 81003 URINALYSIS AUTO W/O SCOPE: CPT | Performed by: FAMILY MEDICINE

## 2025-06-17 PROCEDURE — 99214 OFFICE O/P EST MOD 30 MIN: CPT | Performed by: FAMILY MEDICINE

## 2025-06-17 PROCEDURE — 1159F MED LIST DOCD IN RCRD: CPT | Performed by: FAMILY MEDICINE

## 2025-06-17 RX ORDER — SILDENAFIL CITRATE 20 MG/1
20 TABLET ORAL DAILY
Qty: 30 TABLET | Refills: 11 | Status: SHIPPED | OUTPATIENT
Start: 2025-06-17 | End: 2025-07-17

## 2025-06-17 RX ORDER — INSULIN PMP CART,AUT,G6/7,CNTR
EACH SUBCUTANEOUS
COMMUNITY
Start: 2025-05-07

## 2025-06-17 RX ORDER — TAMSULOSIN HYDROCHLORIDE 0.4 MG/1
0.4 CAPSULE ORAL DAILY
Qty: 30 CAPSULE | Refills: 11 | Status: SHIPPED | OUTPATIENT
Start: 2025-06-17 | End: 2026-06-17

## 2025-06-17 RX ORDER — PEN NEEDLE, DIABETIC 32GX 5/32"
NEEDLE, DISPOSABLE MISCELLANEOUS
COMMUNITY
Start: 2024-10-04

## 2025-06-17 ASSESSMENT — ENCOUNTER SYMPTOMS
NAUSEA: 0
VOMITING: 0
DECREASED LIBIDO: 0
CHILLS: 0
DYSURIA: 0
HEMATURIA: 0

## 2025-06-17 ASSESSMENT — PATIENT HEALTH QUESTIONNAIRE - PHQ9
2. FEELING DOWN, DEPRESSED OR HOPELESS: NOT AT ALL
1. LITTLE INTEREST OR PLEASURE IN DOING THINGS: NOT AT ALL
SUM OF ALL RESPONSES TO PHQ9 QUESTIONS 1 AND 2: 0

## 2025-06-17 NOTE — ASSESSMENT & PLAN NOTE
Benign Prostatic Hypertrophy  This is a new problem. The current episode started more than 1 year ago. The problem has been waxing and waning since onset. Irritative symptoms do not include nocturia. Obstructive symptoms include dribbling, incomplete emptying, an intermittent stream, straining and a weak stream. Associated symptoms include hesitancy. Pertinent negatives include no chills, dysuria, genital pain, hematuria, nausea or vomiting. He is sexually active. Past treatments include saw palmetto. He has been using treatment for 1 to 6 months.   Erectile Dysfunction  This is a new problem. The nature of his difficulty is maintaining erection. He reports no decreased libido. He reports his erection duration to be more than 10 minutes. Irritative symptoms do not include nocturia. Obstructive symptoms include dribbling, incomplete emptying, an intermittent stream, straining and a weak stream. Associated symptoms include hesitancy. Pertinent negatives include no chills, dysuria, genital pain or hematuria. Past treatments include nothing. Risk factors include diabetes mellitus and BPH.  Will try Viagra and reviewed medication using precautions recheck 3 months

## 2025-06-17 NOTE — ASSESSMENT & PLAN NOTE
Benign Prostatic Hypertrophy  This is a new problem. The current episode started more than 1 year ago. The problem has been waxing and waning since onset. Irritative symptoms do not include nocturia. Obstructive symptoms include dribbling, incomplete emptying, an intermittent stream, straining and a weak stream. Associated symptoms include hesitancy. Pertinent negatives include no chills, dysuria, genital pain, hematuria, nausea or vomiting. He is sexually active. Past treatments include saw palmetto. He has been using treatment for 1 to 6 months.   Erectile Dysfunction  This is a new problem. The nature of his difficulty is maintaining erection. He reports no decreased libido. He reports his erection duration to be more than 10 minutes. Irritative symptoms do not include nocturia. Obstructive symptoms include dribbling, incomplete emptying, an intermittent stream, straining and a weak stream. Associated symptoms include hesitancy. Pertinent negatives include no chills, dysuria, genital pain or hematuria. Past treatments include nothing. Risk factors include diabetes mellitus and BPH. .. Discussed medication try Flomax for now..  Check BMP

## 2025-06-17 NOTE — PROGRESS NOTES
Subjective   Patient ID: Sundeep Perez is a 69 y.o. male who presents for prostate issue.  Benign Prostatic Hypertrophy  This is a new problem. The current episode started more than 1 year ago. The problem has been waxing and waning since onset. Irritative symptoms do not include nocturia. Obstructive symptoms include dribbling, incomplete emptying, an intermittent stream, straining and a weak stream. Associated symptoms include hesitancy. Pertinent negatives include no chills, dysuria, genital pain, hematuria, nausea or vomiting. He is sexually active. Past treatments include saw palmetto. He has been using treatment for 1 to 6 months.   Erectile Dysfunction  This is a new problem. The nature of his difficulty is maintaining erection. He reports no decreased libido. He reports his erection duration to be more than 10 minutes. Irritative symptoms do not include nocturia. Obstructive symptoms include dribbling, incomplete emptying, an intermittent stream, straining and a weak stream. Associated symptoms include hesitancy. Pertinent negatives include no chills, dysuria, genital pain or hematuria. Past treatments include nothing. Risk factors include diabetes mellitus and BPH.       Review of Systems   Constitutional:  Negative for chills.   Gastrointestinal:  Negative for nausea and vomiting.   Genitourinary:  Positive for hesitancy and incomplete emptying. Negative for decreased libido, dysuria, hematuria and nocturia.       Objective   Physical Exam  general: alert oriented x three  HEENT hearing normal to voice  Neck supple  Lungs respirations non-labored.  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 The current episode started more than 1 year ago. The problem has been waxing and waning since onset. Irritative symptoms do not include nocturia. Obstructive symptoms include dribbling, incomplete emptying, an intermittent stream, straining and a weak stream. Associated symptoms include hesitancy. Pertinent negatives include no chills, dysuria, genital pain, hematuria, nausea or vomiting. He is sexually active. Past treatments include saw palmetto. He has been using treatment for 1 to 6 months.   Erectile Dysfunction  This is a new problem. The nature of his difficulty is maintaining erection. He reports no decreased libido. He reports his erection duration to be more than 10 minutes. Irritative symptoms do not include nocturia. Obstructive symptoms include dribbling, incomplete emptying, an intermittent stream, straining and a weak stream. Associated symptoms include hesitancy. Pertinent negatives include no chills, dysuria, genital pain or hematuria. Past treatments include nothing. Risk factors include diabetes mellitus and BPH. .. Discussed medication try Flomax for now..  Check BMP         Relevant Medications    tamsulosin (Flomax) 0.4 mg 24 hr capsule    Other Relevant Orders    Basic Metabolic Panel    Other male erectile dysfunction    Benign Prostatic Hypertrophy  This is a new problem. The current episode started more than 1 year ago. The problem has been waxing and waning since onset. Irritative symptoms do not include nocturia. Obstructive symptoms include dribbling, incomplete emptying, an intermittent stream, straining and a weak stream. Associated symptoms include hesitancy. Pertinent negatives include no chills, dysuria, genital pain, hematuria, nausea or vomiting. He is sexually active. Past treatments  include saw palmetto. He has been using treatment for 1 to 6 months.   Erectile Dysfunction  This is a new problem. The nature of his difficulty is maintaining erection. He reports no decreased libido. He reports his erection duration to be more than 10 minutes. Irritative symptoms do not include nocturia. Obstructive symptoms include dribbling, incomplete emptying, an intermittent stream, straining and a weak stream. Associated symptoms include hesitancy. Pertinent negatives include no chills, dysuria, genital pain or hematuria. Past treatments include nothing. Risk factors include diabetes mellitus and BPH.  Will try Viagra and reviewed medication using precautions recheck 3 months          Other Visit Diagnoses         Healthcare maintenance        Relevant Orders    POCT UA Automated manually resulted (Completed)      Screening for prostate cancer        Relevant Orders    Prostate Specific Antigen, Screen      Erectile dysfunction due to arterial insufficiency        Relevant Medications    sildenafil (Revatio) 20 mg tablet      Type 1 diabetes mellitus without retinopathy (Multi)

## 2025-06-19 LAB
ANION GAP SERPL CALCULATED.4IONS-SCNC: 7 MMOL/L (CALC) (ref 7–17)
BUN SERPL-MCNC: 17 MG/DL (ref 7–25)
BUN/CREAT SERPL: ABNORMAL (CALC) (ref 6–22)
CALCIUM SERPL-MCNC: 9.1 MG/DL (ref 8.6–10.3)
CHLORIDE SERPL-SCNC: 104 MMOL/L (ref 98–110)
CO2 SERPL-SCNC: 29 MMOL/L (ref 20–32)
CREAT SERPL-MCNC: 1.2 MG/DL (ref 0.7–1.28)
EGFRCR SERPLBLD CKD-EPI 2021: 65 ML/MIN/1.73M2
GLUCOSE SERPL-MCNC: 143 MG/DL (ref 65–139)
POTASSIUM SERPL-SCNC: 4.4 MMOL/L (ref 3.5–5.3)
PSA SERPL-MCNC: 0.76 NG/ML
SODIUM SERPL-SCNC: 140 MMOL/L (ref 135–146)

## 2025-10-01 ENCOUNTER — APPOINTMENT (OUTPATIENT)
Dept: PRIMARY CARE | Facility: CLINIC | Age: 70
End: 2025-10-01
Payer: MEDICARE